# Patient Record
Sex: FEMALE | URBAN - METROPOLITAN AREA
[De-identification: names, ages, dates, MRNs, and addresses within clinical notes are randomized per-mention and may not be internally consistent; named-entity substitution may affect disease eponyms.]

---

## 2022-05-17 ENCOUNTER — OFFICE VISIT (OUTPATIENT)
Dept: URBAN - METROPOLITAN AREA CLINIC 64 | Facility: CLINIC | Age: 47
End: 2022-05-17

## 2022-05-17 DIAGNOSIS — H33.012 RETINAL DETACHMENT WITH SINGLE BREAK, LEFT EYE: Primary | ICD-10-CM

## 2022-05-17 PROCEDURE — 99203 OFFICE O/P NEW LOW 30 MIN: CPT | Performed by: OPTOMETRIST

## 2022-05-17 ASSESSMENT — KERATOMETRY
OD: 43.98
OS: 43.73

## 2022-05-17 ASSESSMENT — INTRAOCULAR PRESSURE
OD: 17
OS: 15

## 2022-05-17 NOTE — IMPRESSION/PLAN
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 Plan: Loss of superior nasal vision OS X 3 months. There is a pigmented elevation behind the iris and under the lens OS. RD vs. tumor. Recommend we consult with a retina specialist ASAP. We'll call the retina team and see when they recommend / are able to see her.

## 2022-05-19 ENCOUNTER — OFFICE VISIT (OUTPATIENT)
Dept: URBAN - METROPOLITAN AREA CLINIC 64 | Facility: CLINIC | Age: 47
End: 2022-05-19

## 2022-05-19 DIAGNOSIS — H43.813 VITREOUS DEGENERATION, BILATERAL: ICD-10-CM

## 2022-05-19 DIAGNOSIS — C69.32 MALIGNANT NEOPLASM OF LEFT CHOROID: ICD-10-CM

## 2022-05-19 PROCEDURE — 92134 CPTRZ OPH DX IMG PST SGM RTA: CPT | Performed by: OPHTHALMOLOGY

## 2022-05-19 PROCEDURE — 99204 OFFICE O/P NEW MOD 45 MIN: CPT | Performed by: OPHTHALMOLOGY

## 2022-05-19 ASSESSMENT — INTRAOCULAR PRESSURE
OS: 14
OD: 20

## 2022-05-19 NOTE — IMPRESSION/PLAN
Impression: Congestive heart failure: I50.20. Plan: UNDER CARE of CARDIOLOGY w meds for NATALIE. HEART FAILURE -- During this time (early '22) when pt noticed RED eye and then Apr '22 had Dec Vision.

## 2022-05-19 NOTE — IMPRESSION/PLAN
Impression: Malignant neoplasm of left choroid Plan: SUSPECTED LEFT EYE MELANOMA vs. METASTATIC Tumor -- CHOROIDAL mass 1/4 of eye volume inf /temporal pigmented - SENTINAL Vessels.    
    Марина Oliver et al.

## 2022-06-01 ENCOUNTER — OFFICE VISIT (OUTPATIENT)
Dept: URBAN - METROPOLITAN AREA CLINIC 10 | Facility: CLINIC | Age: 47
End: 2022-06-01
Payer: COMMERCIAL

## 2022-06-01 DIAGNOSIS — C69.32 MALIGNANT NEOPLASM OF LEFT CHOROID: Primary | ICD-10-CM

## 2022-06-01 PROCEDURE — 92285 EXTERNAL OCULAR PHOTOGRAPHY: CPT | Performed by: OPHTHALMOLOGY

## 2022-06-01 PROCEDURE — 99214 OFFICE O/P EST MOD 30 MIN: CPT | Performed by: OPHTHALMOLOGY

## 2022-06-01 RX ORDER — ERYTHROMYCIN 5 MG/G
OINTMENT OPHTHALMIC
Qty: 2 | Refills: 1 | Status: ACTIVE
Start: 2022-06-01

## 2022-06-01 NOTE — IMPRESSION/PLAN
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 referred by Dr. Sanjiv Grimaldo. RBAI of enucleation surgery d/w patient. All questions answered. Will tentatitino plan for enucleation OS with [place] needle biopsy on 6/15/22.

## 2022-06-20 ENCOUNTER — POST-OPERATIVE VISIT (OUTPATIENT)
Dept: URBAN - METROPOLITAN AREA CLINIC 34 | Facility: CLINIC | Age: 47
End: 2022-06-20
Payer: COMMERCIAL

## 2022-06-20 PROCEDURE — 99024 POSTOP FOLLOW-UP VISIT: CPT | Performed by: OPHTHALMOLOGY

## 2022-06-20 NOTE — IMPRESSION/PLAN
Impression:  Encounter for other specified surgical aftercare  Z48.89. Plan: healing well. no signs of infection or implant exposure. conformer removed and replaced. Explained that path and needle biopsy results are pending; will contact with results. Post-op instructions reviewed.  
RTC 6-8 weeks or sooner PRN

## 2022-06-24 ENCOUNTER — OFFICE VISIT (OUTPATIENT)
Dept: URBAN - METROPOLITAN AREA CLINIC 64 | Facility: CLINIC | Age: 47
End: 2022-06-24
Payer: COMMERCIAL

## 2022-06-24 DIAGNOSIS — Z48.89 ENCOUNTER FOR OTHER SPECIFIED SURGICAL AFTERCARE: Primary | ICD-10-CM

## 2022-06-24 PROCEDURE — 99214 OFFICE O/P EST MOD 30 MIN: CPT | Performed by: OPTOMETRIST

## 2022-06-24 RX ORDER — ERYTHROMYCIN 5 MG/G
OINTMENT OPHTHALMIC
Qty: 2 | Refills: 1 | Status: ACTIVE
Start: 2022-06-24

## 2022-06-24 NOTE — IMPRESSION/PLAN
Impression: Encounter for other specified surgical aftercare  Z48.89. Plan: Conformer replaced today. Re tapped and eye patch given to pt. Showed pt how to take and eye patch. RTC when needed.

## 2022-06-30 ENCOUNTER — POST-OPERATIVE VISIT (OUTPATIENT)
Dept: URBAN - METROPOLITAN AREA CLINIC 24 | Facility: CLINIC | Age: 47
End: 2022-06-30
Payer: COMMERCIAL

## 2022-06-30 DIAGNOSIS — Z48.89 ENCOUNTER FOR OTHER SPECIFIED SURGICAL AFTERCARE: Primary | ICD-10-CM

## 2022-06-30 PROCEDURE — 99024 POSTOP FOLLOW-UP VISIT: CPT | Performed by: OPHTHALMOLOGY

## 2022-06-30 NOTE — IMPRESSION/PLAN
Impression:  Encounter for other specified surgical aftercare  Z48.89. Plan: s/p left enucleation. Healing well. No signs of infection or implant exposure. small area chemosis medially. explained that this will improve over time. post-op instructions reviewed. all questions answered. REsults of needle biopsy testing discussed with patient. All questions answered. Patient will follow up with oncologist in [place] for [facility] and further treatment. f/u with me as scheduled in August; if well healed can get prosthesis.

## 2022-08-23 ENCOUNTER — POST-OPERATIVE VISIT (OUTPATIENT)
Dept: URBAN - METROPOLITAN AREA CLINIC 34 | Facility: CLINIC | Age: 47
End: 2022-08-23
Payer: COMMERCIAL

## 2022-08-23 DIAGNOSIS — Z48.89 ENCOUNTER FOR OTHER SPECIFIED SURGICAL AFTERCARE: Primary | ICD-10-CM

## 2022-08-23 PROCEDURE — 99024 POSTOP FOLLOW-UP VISIT: CPT | Performed by: OPHTHALMOLOGY

## 2022-08-23 NOTE — IMPRESSION/PLAN
Impression: S/P Enucleation OS - 69 Days. Encounter for other specified surgical aftercare  Z48.89. Plan: doing well following left enucleation surgery in the setting of choroidal melanoma. inner scleral extension but no outer scleral extension. Patient will follow up with her oncologist in [place] for screening.
- no signs of infection or implant exposure on exam. okay to get prosthesis. RTC 9 months with me.

## 2022-09-23 ENCOUNTER — OFFICE VISIT (OUTPATIENT)
Dept: URBAN - METROPOLITAN AREA CLINIC 64 | Facility: CLINIC | Age: 47
End: 2022-09-23
Payer: COMMERCIAL

## 2022-09-23 DIAGNOSIS — C69.32 MALIGNANT NEOPLASM OF LEFT CHOROID: ICD-10-CM

## 2022-09-23 DIAGNOSIS — H16.221 KERATOCONJUNCTIVITIS SICCA OF RIGHT EYE: Primary | ICD-10-CM

## 2022-09-23 DIAGNOSIS — H52.13 MYOPIA, BILATERAL: ICD-10-CM

## 2022-09-23 DIAGNOSIS — H52.11 MYOPIA, RIGHT EYE: ICD-10-CM

## 2022-09-23 PROCEDURE — 99213 OFFICE O/P EST LOW 20 MIN: CPT | Performed by: OPTOMETRIST

## 2022-09-23 RX ORDER — LOTEPREDNOL ETABONATE 5 MG/G
0.5 % GEL OPHTHALMIC
Qty: 4 | Refills: 0 | Status: ACTIVE
Start: 2022-09-23

## 2022-09-23 ASSESSMENT — VISUAL ACUITY: OD: 20/20

## 2022-09-23 ASSESSMENT — INTRAOCULAR PRESSURE: OD: 20

## 2022-09-23 NOTE — IMPRESSION/PLAN
Impression: Keratoconjunctivitis sicca of right eye: H16.221. Plan: Recommend oil based AT's  (Systane Balance)  PRN, 1000 mg fish oil supplements ,hot comp and lid scrubs. Patient educated on use of punctal plugs, pamphlet given. RX lotemax QID x2 weeks, then taper Lotemax to BID x1 week and stop. RTC 2 weeks for FU.

## 2022-09-23 NOTE — IMPRESSION/PLAN
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 S/p enucleation OS with Dr. Zuleyma Floyd on 06/15/22. Conformer in place. Doing well, keep appointment with , and FU with surgeon as needed.

## 2022-09-23 NOTE — IMPRESSION/PLAN
Impression: Myopia, right eye: H52.11. Plan: Discussed diagnosis in detail with patient. New glasses Rx was given today, monocular precautions advised.